# Patient Record
Sex: MALE | URBAN - METROPOLITAN AREA
[De-identification: names, ages, dates, MRNs, and addresses within clinical notes are randomized per-mention and may not be internally consistent; named-entity substitution may affect disease eponyms.]

---

## 2024-06-25 DIAGNOSIS — Z31.440 ENCOUNTER OF MALE FOR TESTING FOR GENETIC DISEASE CARRIER STATUS FOR PROCREATIVE MANAGEMENT: Primary | ICD-10-CM

## 2024-06-25 NOTE — PROGRESS NOTES
"[facility]  Genetic Counseling Consult    Patient:  Danielle Max Santos Senthirajah \"Tom\" YOB: 1990   Date of Service:  06/25/24      Danielle Santana accompanied his partner, Ana, to her appointment at the [facility] for genetic consultation. The option to pursue carrier screening was discussed today.        Impression/Plan:   1. Tom and his partner, Ana elected to pursue expanded carrier screening for 613 conditions through [employer]. Results are expected within 2-3 weeks, and will be available in [employer].  We will contact the couple to discuss the results. The couple requested that we contact Ana with results once both are available. She provided verbal permission for results to be left in her voicemail. Authorization to share protected health information was signed today's visit.    Carrier Screening:   Expanded carrier screening for mutations in a large panel of genes associated with autosomal recessive conditions including cystic fibrosis, spinal muscular atrophy, and others, is now available.  We discussed that expanded carrier screening is designed to identify carrier status for conditions that are primarily childhood or adolescent onset. Expanded carrier screening does not evaluate for adult-onset conditions such as hereditary cancer syndromes, dementia/ Alzheimer's disease, or cardiovascular disease risk factors. Additionally, expanded carrier screening is not comprehensive for all known genetic diseases or inherited conditions. This is a screening test, and residual carrier status risk figures will be provided to the patient after results become available. Carrier screening is not meant to diagnose the patient with a condition, and generally carriers are asymptomatic. However, certain genes may confer increased risks for various health concerns in carriers (DMD, FMR1, etc).  Patients are encouraged to " "share results with their primary care providers to ensure appropriate screening. If we are notified by the performing laboratory of a variant reclassification, the patient will be contacted.   We reviewed that there is a law in place, the Genetic Information Nondiscrimination Act (PEACE), that protects patients from discrimination by health insurance companies and employers based on their genetic information. PEACE does not protect against discrimination by life insurance companies or disability insurance.  We reviewed availability of expanded carrier screening through [employer] and [facility] laboratory and different panel sizes.  If an individual is a carrier, family members could be as well. The patient is encouraged to share positive results with siblings and other family members of reproductive age. Additionally, even if there is not a high reproductive risk for a condition, it is possible that carrier status can be passed on to future generations.   [facility] is not responsible for this billing, it is handled entirely by the performing lab. The patient has the responsibility of continuing with insurance billing or the option of changing to self pay. Many plans \"cover\" genetic testing but that does not mean free. Covered benefits go towards a deductible or out of pocket maximum (if a plan has these). If the patient decides the better financial option is to do self pay instead, it is their responsibility to communicate this to the performing lab. Genetic counselors have limited availability to change or help with this process. [facility] billing can also be reached at [phone].    We reviewed the benefits and limitations of this testing.  Screening tests provide a risk assessment specific to the pregnancy for certain fetal chromosome abnormalities, but cannot definitively diagnose or exclude a fetal chromosome abnormality.  Follow-up genetic counseling and consideration of diagnostic testing is recommended with " any abnormal screening result.     Diagnostic tests carry inherent risks- including risk of miscarriage- that require careful consideration.  These tests can detect fetal chromosome abnormalities with greater than 99% certainty.  Results can be compromised by maternal cell contamination or mosaicism, and are limited by the resolution of cytogenetic G-banding technology.  There is no screening nor diagnostic test that can detect all forms of birth defects or mental disability.    It was a pleasure to be involved with Danielle Santana's care.     Shama Cash MS, [facility]  Licensed Genetic Counselor  [facility]  Pager: [phone]  Office: [phone]

## 2024-06-28 ENCOUNTER — TELEPHONE (OUTPATIENT)
Dept: MATERNAL FETAL MEDICINE | Facility: CLINIC | Age: 34
End: 2024-06-28

## 2024-06-28 NOTE — TELEPHONE ENCOUNTER
June 28, 2024    Patient will provide us with copy of active insurance for testing.     Micki Morgan MS, [facility]  Licensed Genetic Counselor  [facility]  Maternal Fetal Medicine  Ph: [phone]  [email or web address]

## 2024-06-28 NOTE — TELEPHONE ENCOUNTER
June 28, 2024    Received call from patient stating he would like to coordinate blood draw for Gigi carrier screening previously discussed with genetic counseling. He will plan to go to [facility] outpatient lab on Monday for draw. Kit will be brought to lab ahead of Monday. He shared they would like to start with testing for him first. They are aware testing for his partner remains available and we reviewed his negative testing would reduce risk for the conditions included, however cannot reduce risk for x-linked conditions his partner could carry. He verbalized understanding.     Micki Morgan MS, [facility]  Licensed Genetic Counselor  [facility]  Maternal Fetal Medicine  Ph: [phone]  [email or web address]

## 2024-07-01 ENCOUNTER — LAB (OUTPATIENT)
Dept: LAB | Facility: CLINIC | Age: 34
End: 2024-07-01
Payer: COMMERCIAL

## 2024-07-01 ENCOUNTER — TRANSFERRED RECORDS (OUTPATIENT)
Dept: HEALTH INFORMATION MANAGEMENT | Facility: CLINIC | Age: 34
End: 2024-07-01

## 2024-07-01 DIAGNOSIS — Z31.440 ENCOUNTER OF MALE FOR TESTING FOR GENETIC DISEASE CARRIER STATUS FOR PROCREATIVE MANAGEMENT: ICD-10-CM

## 2024-07-01 PROCEDURE — 36415 COLL VENOUS BLD VENIPUNCTURE: CPT

## 2024-07-17 ENCOUNTER — TRANSFERRED RECORDS (OUTPATIENT)
Dept: HEALTH INFORMATION MANAGEMENT | Facility: CLINIC | Age: 34
End: 2024-07-17
Payer: COMMERCIAL

## 2024-07-18 ENCOUNTER — TELEPHONE (OUTPATIENT)
Dept: MATERNAL FETAL MEDICINE | Facility: CLINIC | Age: 34
End: 2024-07-18
Payer: COMMERCIAL

## 2024-07-18 ENCOUNTER — TELEPHONE (OUTPATIENT)
Dept: FAMILY MEDICINE | Facility: CLINIC | Age: 34
End: 2024-07-18
Payer: COMMERCIAL

## 2024-07-18 ENCOUNTER — TELEPHONE (OUTPATIENT)
Dept: PEDIATRICS | Facility: CLINIC | Age: 34
End: 2024-07-18
Payer: COMMERCIAL

## 2024-07-18 LAB — SCANNED LAB RESULT: NORMAL

## 2024-07-18 NOTE — TELEPHONE ENCOUNTER
Ziyad Manjarrez, I left these results on voicemail this morning and mojica end to email as well. Thank you, Shama Cash MS, [facility]

## 2024-07-18 NOTE — TELEPHONE ENCOUNTER
Patient calling in requesting recent genetic screening results be emailed to him at [email or web address]. Routing to genetic counselors for review/to send if appropriate.    Josseline Schmitt RN

## 2024-07-18 NOTE — TELEPHONE ENCOUNTER
Date: 07/18/24     I called Ana to review the results of her partner, Tom's (Danielle Logan), carrier screening for the [place] 613 (560 without x-linked conditions) by [facility] comprehensive carrier screening which assessed 560 genes.     Consent to communicate was signed and couple requested I call Ana at genetic counseling appointment.    She did not answer, so I left a voicemail. Tom was NOT identified to be a carrier for any of the conditions. I discussed that this does not rule out X-linked conditions and should the couple wish for a reproductive risk assessment for X-linked conditions, Ana should pursue carrier screening. Stated she can call me back if she desires to move forward with carrier screening.    Shama Cash MS, [facility]  Licensed Genetic Counselor  [facility]  Pager: [phone]  Office: [phone]    [phone]

## 2025-03-16 ENCOUNTER — HEALTH MAINTENANCE LETTER (OUTPATIENT)
Age: 35
End: 2025-03-16

## 2025-04-28 ENCOUNTER — MEDICAL CORRESPONDENCE (OUTPATIENT)
Dept: HEALTH INFORMATION MANAGEMENT | Facility: CLINIC | Age: 35
End: 2025-04-28
Payer: COMMERCIAL

## 2025-04-28 ENCOUNTER — TRANSCRIBE ORDERS (OUTPATIENT)
Dept: OTHER | Age: 35
End: 2025-04-28

## 2025-04-28 DIAGNOSIS — J34.89 NASAL LESION: Primary | ICD-10-CM

## 2025-04-29 ENCOUNTER — PATIENT OUTREACH (OUTPATIENT)
Dept: CARE COORDINATION | Facility: CLINIC | Age: 35
End: 2025-04-29
Payer: COMMERCIAL

## 2025-05-01 ENCOUNTER — PATIENT OUTREACH (OUTPATIENT)
Dept: CARE COORDINATION | Facility: CLINIC | Age: 35
End: 2025-05-01
Payer: COMMERCIAL